# Patient Record
Sex: MALE | Race: BLACK OR AFRICAN AMERICAN | NOT HISPANIC OR LATINO | URBAN - METROPOLITAN AREA
[De-identification: names, ages, dates, MRNs, and addresses within clinical notes are randomized per-mention and may not be internally consistent; named-entity substitution may affect disease eponyms.]

---

## 2022-10-28 ENCOUNTER — APPOINTMENT (EMERGENCY)
Dept: RADIOLOGY | Facility: HOSPITAL | Age: 32
End: 2022-10-28

## 2022-10-28 ENCOUNTER — HOSPITAL ENCOUNTER (EMERGENCY)
Facility: HOSPITAL | Age: 32
Discharge: HOME | End: 2022-10-29
Attending: EMERGENCY MEDICINE
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
## 2022-10-28 DIAGNOSIS — S43.014A ANTERIOR DISLOCATION OF RIGHT SHOULDER, INITIAL ENCOUNTER: Primary | ICD-10-CM

## 2022-10-28 RX ORDER — FENTANYL CITRATE 50 UG/ML
50 INJECTION, SOLUTION INTRAMUSCULAR; INTRAVENOUS ONCE
Status: COMPLETED | OUTPATIENT
Start: 2022-10-28 | End: 2022-10-28

## 2022-10-28 RX ORDER — PROPOFOL 10 MG/ML
100 INJECTION, EMULSION INTRAVENOUS ONCE
Status: COMPLETED | OUTPATIENT
Start: 2022-10-28 | End: 2022-10-28

## 2022-10-28 RX ORDER — FENTANYL CITRATE 50 UG/ML
100 INJECTION, SOLUTION INTRAMUSCULAR; INTRAVENOUS ONCE
Status: COMPLETED | OUTPATIENT
Start: 2022-10-28 | End: 2022-10-28

## 2022-10-28 RX ADMIN — FENTANYL CITRATE 50 MCG: 50 INJECTION INTRAMUSCULAR; INTRAVENOUS at 22:43

## 2022-10-28 RX ADMIN — FENTANYL CITRATE 100 MCG: 50 INJECTION INTRAMUSCULAR; INTRAVENOUS at 23:49

## 2022-10-28 RX ADMIN — PROPOFOL 120 MG: 10 INJECTION, EMULSION INTRAVENOUS at 23:54

## 2022-10-28 NOTE — Clinical Note
Nola Cruz was seen and treated in our emergency department on 10/28/2022  Other - See Comments        Diagnosis:     Duane    He may return on this date:     No work until cleared by orthopedics/occupational medicine  If you have any questions or concerns, please don't hesitate to call        Kym Jensen DO    ______________________________           _______________          _______________  Hospital Representative                              Date                                Time

## 2022-10-28 NOTE — Clinical Note
Nikole Hunter was seen and treated in our emergency department on 10/28/2022  Other - See Comments        Diagnosis:     Duane    He may return on this date:     No work until cleared by orthopedics/occupational medicine  If you have any questions or concerns, please don't hesitate to call        Deng Patel, DO    ______________________________           _______________          _______________  Hospital Representative                              Date                                Time

## 2022-10-29 ENCOUNTER — APPOINTMENT (EMERGENCY)
Dept: RADIOLOGY | Facility: HOSPITAL | Age: 32
End: 2022-10-29

## 2022-10-29 ENCOUNTER — APPOINTMENT (OUTPATIENT)
Dept: URGENT CARE | Facility: CLINIC | Age: 32
End: 2022-10-29

## 2022-10-29 VITALS
SYSTOLIC BLOOD PRESSURE: 151 MMHG | DIASTOLIC BLOOD PRESSURE: 75 MMHG | OXYGEN SATURATION: 99 % | RESPIRATION RATE: 18 BRPM | TEMPERATURE: 97.3 F | WEIGHT: 220 LBS | HEART RATE: 75 BPM

## 2022-10-29 RX ORDER — NAPROXEN 375 MG/1
375 TABLET ORAL 2 TIMES DAILY WITH MEALS
Qty: 20 TABLET | Refills: 0 | Status: SHIPPED | OUTPATIENT
Start: 2022-10-29

## 2022-10-29 NOTE — ED ATTENDING ATTESTATION
10/28/2022  IChirag MD, saw and evaluated the patient  I have discussed the patient with the resident/non-physician practitioner and agree with the resident's/non-physician practitioner's findings, Plan of Care, and MDM as documented in the resident's/non-physician practitioner's note, except where noted  All available labs and Radiology studies were reviewed  I was present for key portions of any procedure(s) performed by the resident/non-physician practitioner and I was immediately available to provide assistance  At this point I agree with the current assessment done in the Emergency Department  I have conducted an independent evaluation of this patient a history and physical is as follows:    26-year-old male presented for evaluation acute right shoulder pain after fall from ladder at work today  Reports he lost his footing and fell down, attempting to break his fall held on to the ladder causing acute pain in the shoulder  Landed on his feet  No head strike  No anticoagulation  Has ongoing right shoulder pain with limitation of movement  On exam he has obvious deformity with flattening of the deltoid  Reported some paresthesias in hand but has normal movement, strength  Normal radial pulse  Normal capillary refill  X-ray shows anterior glenohumeral dislocation without fracture  Plan moderate sedation for closed reduction  ED Course  ED Course as of 10/29/22 0056   Sat Oct 29, 2022   0036 Patient moderately sedated with propofol fall, fentanyl for pain control  Closed reduction with Radhames Ignacio PGY2 confirmed on postreduction film  No immediate complications           Critical Care Time  Procedures

## 2022-10-29 NOTE — ED PROVIDER NOTES
History  Chief Complaint   Patient presents with   • Shoulder Injury     Pt states he fell off off ladder (about 8ft) about 30 minutes afo  C/o of right shoulder pain  Denies head strike, thinners and aspirin  GCS 15  Enrique Montano is a 26-year-old male who presents with right shoulder pain after falling off ladder at work 30 minutes prior to arrival   [employer] he lost his balance, grabbed the ladder with his right arm to break his fall, felt a pop in his shoulder with immediate onset sharp to aching, severe, non-radiating right shoulder  pain, has not been able to move his right shoulder since, feels it is out of place  Has mild numbness and tingling in his right hand, however is able to move his right elbow, wrist, and fingers  Denies hitting head or losing consciousness  No prior right shoulder injuries  No other injuries  None       History reviewed  No pertinent past medical history  History reviewed  No pertinent surgical history  History reviewed  No pertinent family history  I have reviewed and agree with the history as documented  E-Cigarette/Vaping     E-Cigarette/Vaping Substances     Social History     Tobacco Use   • Smoking status: Current Every Day Smoker     Packs/day: 1 00     Years: 10 00     Pack years: 10 00     Types: Cigarettes   • Smokeless tobacco: Current User   Substance Use Topics   • Alcohol use: Yes   • Drug use: Not Currently        Review of Systems   Constitutional: Negative  Negative for fatigue  HENT: Negative  Negative for facial swelling  Eyes: Negative  Negative for visual disturbance  Respiratory: Negative  Negative for shortness of breath  Cardiovascular: Negative  Negative for chest pain and palpitations  Gastrointestinal: Negative  Negative for abdominal pain, diarrhea, nausea and vomiting  Genitourinary: Negative  Negative for decreased urine volume  Musculoskeletal: Negative for back pain, gait problem and neck pain          Endorses right shoulder pain  Skin: Negative  Negative for pallor and wound  Neurological: Negative  Negative for dizziness, syncope, weakness, light-headedness and headaches  Hematological: Negative  Does not bruise/bleed easily  All other systems reviewed and are negative  Physical Exam  ED Triage Vitals [10/28/22 2157]   Temperature Pulse Respirations Blood Pressure SpO2   (!) 97 3 °F (36 3 °C) 55 20 153/97 98 %      Temp Source Heart Rate Source Patient Position - Orthostatic VS BP Location FiO2 (%)   Oral Monitor Sitting Left arm --      Pain Score       10 - Worst Possible Pain             Orthostatic Vital Signs  Vitals:    10/29/22 0209 10/29/22 0210 10/29/22 0212 10/29/22 0215   BP:  144/75  151/75   Pulse: 61  55 75   Patient Position - Orthostatic VS:           Physical Exam  Vitals and nursing note reviewed  Constitutional:       Appearance: He is not diaphoretic  Comments: Appears to be in pain  HENT:      Head: Normocephalic and atraumatic  Right Ear: External ear normal       Left Ear: External ear normal       Nose: Nose normal       Mouth/Throat:      Mouth: Mucous membranes are moist       Pharynx: Oropharynx is clear  Eyes:      Extraocular Movements: Extraocular movements intact  Conjunctiva/sclera: Conjunctivae normal       Pupils: Pupils are equal, round, and reactive to light  Cardiovascular:      Rate and Rhythm: Normal rate and regular rhythm  Pulses: Normal pulses  Heart sounds: Normal heart sounds  Pulmonary:      Effort: Pulmonary effort is normal  No respiratory distress  Breath sounds: Normal breath sounds  Abdominal:      General: Abdomen is flat  Bowel sounds are normal       Palpations: Abdomen is soft  Tenderness: There is no abdominal tenderness  Musculoskeletal:      Right shoulder: Deformity, tenderness and bony tenderness present  Decreased range of motion  Normal pulse        Left shoulder: Normal       Cervical back: Normal, normal range of motion and neck supple  No tenderness  Thoracic back: Normal       Lumbar back: Normal       Comments: Right shoulder deformed, unable to tolerate any passive ROM  RUE with decreased sensation over right forearm and hand, however 2+ radial pulses present  Skin:     General: Skin is warm and dry  Capillary Refill: Capillary refill takes less than 2 seconds  Neurological:      General: No focal deficit present  Mental Status: He is alert  Psychiatric:         Mood and Affect: Mood normal          Behavior: Behavior normal          ED Medications  Medications   fentanyl citrate (PF) 100 MCG/2ML 50 mcg (50 mcg Intravenous Given 10/28/22 2243)   fentanyl citrate (PF) 100 MCG/2ML 100 mcg (100 mcg Intravenous Given 10/28/22 2349)   propofol (DIPRIVAN) 200 MG/20ML bolus injection 100 mg (120 mg Intravenous Given 10/28/22 2354)       Diagnostic Studies  Results Reviewed     None                 XR shoulder 1 vw right   ED Interpretation by Lay Piña MD (10/29 0034)   Reduction of anterior dislocation  Final Result by Conchis Ribeiro MD (10/29 1022)      Right glenohumeral dislocation has now been reduced  Probable small Hill-Sachs deformity is evident  The study was marked in [facility] for immediate notification  Workstation performed: [id number]         XR shoulder 2+ views RIGHT   ED Interpretation by Lay Piña MD (10/29 6534)   Anterior glenohumeral dislocation      Final Result by Conchis Ribeiro MD (10/29 1019)      Anteromedial glenohumeral dislocation  Anteromedial dislocation was noted on the ER preliminary result  Distal humeral shaft is limited without gross abnormality  Follow-up could be performed if symptoms persist       Workstation performed: [id number]         XR humerus RIGHT   ED Interpretation by Lay Piña MD (10/29 0034)   No fracture        Final Result by Conchis Ribeiro MD (10/29 1019)      Anteromedial glenohumeral dislocation  Anteromedial dislocation was noted on the ER preliminary result  Distal humeral shaft is limited without gross abnormality  Follow-up could be performed if symptoms persist       Workstation performed: [id number]               Procedures  Pre-Procedural Sedation  Performed by: Dariela Warren DO  Authorized by: Dariela Warren DO     Consent:     Consent obtained:  Written    Consent given by:  Patient    Risks discussed:  Prolonged hypoxia resulting in organ damage, allergic reaction, dysrhythmia, prolonged sedation necessitating reversal, respiratory compromise necessitating ventilatory assistance and intubation, inadequate sedation, nausea and vomiting    Alternatives discussed:  Analgesia without sedation, anxiolysis and regional anesthesia  [place] protocol:     Procedure explained and questions answered to patient or proxy's satisfaction: yes      Relevant documents present and verified: yes      Test results available and properly labeled: yes      Radiology Images displayed and confirmed    If images not available, report reviewed: yes      Required blood products, implants, devices, and special equipment available: yes      Site/side marked: yes      Immediately prior to procedure a time out was called: yes      Patient identity confirmation method:  Verbally with patient and arm band  Indications:     Sedation purpose:  Dislocation reduction    Procedure necessitating sedation performed by:  Physician performing sedation    Intended level of sedation:  Moderate (conscious sedation)  Pre-sedation assessment:     Time since last food or drink:  9 hours    ASA classification: class 1 - normal, healthy patient      Neck mobility: normal      Mouth opening:  3 or more finger widths    Thyromental distance:  4 finger widths    Mallampati score:  II - soft palate, uvula, fauces visible    Pre-sedation assessments completed and reviewed: airway patency, cardiovascular function, hydration status, mental status, nausea/vomiting, pain level, respiratory function and temperature      History of difficult intubation: no      Pre-sedation assessment completed:  10/28/2022 11:24 PM    Procedural Sedation    Date/Time: 10/28/2022 11:50 PM  Performed by: Chico Beavers DO  Authorized by: Chico Beavers DO     Immediate pre-procedure details:     Reassessment: Patient reassessed immediately prior to procedure      Reviewed: vital signs, relevant labs/tests and NPO status      Verified: bag valve mask available, emergency equipment available, intubation equipment available, IV patency confirmed, oxygen available and suction available    Procedure details (see MAR for exact dosages):     Sedation start time:  10/28/2022 11:50 PM    Preoxygenation:  Room air    Sedation:  Propofol    Analgesia:  Fentanyl    Intra-procedure monitoring:  Blood pressure monitoring, continuous capnometry, frequent LOC assessments, frequent vital sign checks, continuous pulse oximetry and cardiac monitor    Intra-procedure events: hypoxia      Intra-procedure management:  Airway repositioning and supplemental oxygen    Sedation end time:  10/29/2022 12:05 PM    Total sedation time (minutes):  15  Post-procedure details:     Attendance: Constant attendance by certified staff until patient recovered      Recovery: Patient returned to pre-procedure baseline      Post-sedation assessments completed and reviewed: airway patency, cardiovascular function, hydration status, mental status, nausea/vomiting, pain level, respiratory function and temperature      Patient is stable for discharge or admission: yes      Patient tolerance:   Tolerated well, no immediate complications  Orthopedic injury treatment    Date/Time: 10/28/2022 11:50 PM  Performed by: Chico Beavers DO  Authorized by: Chico Beavers DO     Patient Location:  ED  [place] Protocol:  Procedure performed by: (Dr Vijay Gilbert, Dr Hema Jefferson)  Consent: Verbal consent obtained  Written consent obtained  Risks and benefits: risks, benefits and alternatives were discussed  Consent given by: patient  Time out: Immediately prior to procedure a "time out" was called to verify the correct patient, procedure, equipment, support staff and site/side marked as required  Timeout called at: 10/28/2022 11:50 PM   Patient understanding: patient states understanding of the procedure being performed  Patient consent: the patient's understanding of the procedure matches consent given  Procedure consent: procedure consent matches procedure scheduled  Relevant documents: relevant documents present and verified  Test results: test results available and properly labeled  Site marked: the operative site was marked  Radiology Images displayed and confirmed  If images not available, report reviewed: imaging studies available  Required items: required blood products, implants, devices, and special equipment available  Patient identity confirmed: verbally with patient and arm band      Injury location:  Shoulder  Location details:  Right shoulder  Injury type:  Dislocation  Dislocation type: anterior    Chronicity:  New  Hill-Sachs deformity?: No    Distal perfusion: normal    Neurological function: diminished    Range of motion: reduced    Local anesthesia used?: No    General anesthesia used?: No    Sedation type:   Moderate (conscious) sedation (See separate Procedural Sedation form)  Manipulation performed?: Yes    Reduction method:  External rotation  Reduction method:  External rotation  Reduction method:  External rotation  Reduction method:  External rotation  Reduction method:  External rotation  Reduction method:  External rotation  Reduction successful?: Yes    Confirmation: Reduction confirmed by x-ray    Immobilization:  Sling  Neurovascular status: Neurovascularly intact    Distal perfusion: normal    Neurological function: normal    Range of motion: normal    Patient tolerance:  Patient tolerated the procedure well with no immediate complications          ED Course                                       MDM  Number of Diagnoses or Management Options  Anterior dislocation of right shoulder, initial encounter  Fall, initial encounter  Diagnosis management comments: Pt presents with right shoulder pain, no other injuries, s/p fall from ladder  No LOC or head strike  Workup consistent with right anterior shoulder dislocation  Procedural sedation and reduction performed as documented with return of full sensation to right upper extremity status post successful reduction  Sling placed  To follow-up with Ortho on Monday, work note given  Improved on discharge  Amount and/or Complexity of Data Reviewed  Tests in the radiology section of CPT®: ordered and reviewed  Review and summarize past medical records: yes  Discuss the patient with other providers: yes  Independent visualization of images, tracings, or specimens: yes        Disposition  Final diagnoses:   Anterior dislocation of right shoulder, initial encounter   Fall, initial encounter     Time reflects when diagnosis was documented in both MDM as applicable and the Disposition within this note     Time User Action Codes Description Comment    10/29/2022  2:13 AM Alex Jordan Add [S43 014A] Anterior dislocation of right shoulder, initial encounter     10/29/2022  2:13 AM Alex Jordan Add [D15  Lucy Christopher, initial encounter       ED Disposition     ED Disposition   Discharge    Condition   Stable    Date/Time   Sat Oct 29, 2022  2:13 AM    Comment   Srinivas Webb discharge to home/self care                 Follow-up Information     Follow up With Specialties Details Why Contact Info Additional [phone] [facility] [place] [facility] Schedule an appointment as soon as possible for a visit   [address] [phone]  [address] [place] [address] Specialists [place], [address] [facility] [address] [place], [place], [address] [place] [address]          Discharge Medication List as of 10/29/2022  2:18 AM      START taking these medications    Details   naproxen (NAPROSYN) 375 mg tablet Take 1 tablet (375 mg total) by mouth 2 (two) times a day with meals, Starting Sat 10/29/2022, Print           No discharge procedures on file  PDMP Review     None           ED Provider  Attending physically available and evaluated Gina Ruff I managed the patient along with the ED Attending      Electronically Signed by         Sole Terrell DO  11/06/22 8411

## 2024-04-15 ENCOUNTER — OFFICE VISIT (OUTPATIENT)
Dept: URGENT CARE | Age: 34
End: 2024-04-15
Payer: COMMERCIAL

## 2024-04-15 VITALS
DIASTOLIC BLOOD PRESSURE: 70 MMHG | OXYGEN SATURATION: 100 % | RESPIRATION RATE: 20 BRPM | HEART RATE: 70 BPM | SYSTOLIC BLOOD PRESSURE: 110 MMHG | TEMPERATURE: 97.8 F | WEIGHT: 216.3 LBS

## 2024-04-15 DIAGNOSIS — R05.1 ACUTE COUGH: ICD-10-CM

## 2024-04-15 DIAGNOSIS — H60.501 ACUTE OTITIS EXTERNA OF RIGHT EAR, UNSPECIFIED TYPE: Primary | ICD-10-CM

## 2024-04-15 PROCEDURE — 99215 OFFICE O/P EST HI 40 MIN: CPT | Performed by: PHYSICIAN ASSISTANT

## 2024-04-15 RX ORDER — OFLOXACIN 3 MG/ML
10 SOLUTION AURICULAR (OTIC) DAILY
Qty: 5 ML | Refills: 0 | Status: SHIPPED | OUTPATIENT
Start: 2024-04-15 | End: 2024-04-22

## 2024-04-15 NOTE — PATIENT INSTRUCTIONS
Use ofloxacin ear drops as prescribed, keep ear up for 3-5 minutes after application of drops.   Take over the counter Tylenol and Ibuprofen as needed for pain.   If symptoms are not improved in 2-3 days, follow-up with PCP or return.   If symptoms worsen or new symptoms develop, report to the emergency department immediately.

## 2024-04-15 NOTE — PROGRESS NOTES
[facility]        NAME: Duane Fleming is a 34 y.o. male  : 1990    MRN: [id number]  DATE: April 15, 2024  TIME: 7:53 PM    Assessment and Plan   Acute otitis externa of right ear, unspecified type [H60.501]  1. Acute otitis externa of right ear, unspecified type  ofloxacin (FLOXIN) 0.3 % otic solution      2. Acute cough        Patient presents with symptoms and examination consistent with otitis externa he will be started on Floxin drops to treat.  He also has an acute cough and is instructed to continue symptomatic care for this.      Patient Instructions     Patient Instructions   Use ofloxacin ear drops as prescribed, keep ear up for 3-5 minutes after application of drops.   Take over the counter Tylenol and Ibuprofen as needed for pain.   If symptoms are not improved in 2-3 days, follow-up with PCP or return.   If symptoms worsen or new symptoms develop, report to the emergency department immediately.     Follow up with PCP in 3-5 days.  Proceed to  ER if symptoms worsen.    Chief Complaint     Chief Complaint   Patient presents with    Earache     Started with nasal congestion last week and cough . Cough is getting better. Right ear pain x 1 week, has worsened over the last two days. Pain in moving into right jaw, also reporting throat pain on right side.     Cough         History of Present Illness       34-year-old male presents with complaint of right ear pain.  Patient reports that he had runny nose, congestion and cough for approximately 1 week congestion is mostly improved and cough is improving as well.  Cough has been dry.  Patient denies any chest pain or shortness of breath with the cough.  He denies any fever.  Patient reports that he feels like his right ear pain has been getting worse over the last couple days.    Earache   Associated symptoms include coughing and rhinorrhea.   Cough  Associated symptoms include ear pain, postnasal drip and rhinorrhea. Pertinent negatives include  no chills or fever.       Review of Systems   Review of Systems   Constitutional:  Negative for chills and fever.   HENT:  Positive for congestion, ear pain, postnasal drip and rhinorrhea.    Respiratory:  Positive for cough.          Current Medications       Current Outpatient Medications:     ofloxacin (FLOXIN) 0.3 % otic solution, Administer 10 drops to the right ear daily for 7 days, Disp: 5 mL, Rfl: 0    naproxen (NAPROSYN) 375 mg tablet, Take 1 tablet (375 mg total) by mouth 2 (two) times a day with meals (Patient not taking: Reported on 4/15/2024), Disp: 20 tablet, Rfl: 0    Current Allergies     Allergies as of 04/15/2024    (No Known Allergies)            The following portions of the patient's history were reviewed and updated as appropriate: allergies, current medications, past family history, past medical history, past social history, past surgical history and problem list.     No past medical history on file.    No past surgical history on file.    No family history on file.      Medications have been verified.        Objective   /70   Pulse 70   Temp 97.8 °F (36.6 °C) (Temporal)   Resp 20   Wt 98.1 kg (216 lb 4.8 oz)   SpO2 100%   No LMP for male patient.       Physical Exam     Physical Exam  Vitals and nursing note reviewed.   Constitutional:       General: He is not in acute distress.     Appearance: Normal appearance. He is not ill-appearing or toxic-appearing.   HENT:      Head: Normocephalic and atraumatic.      Jaw: No trismus.      Right Ear: Tympanic membrane, ear canal and external ear normal. Drainage, swelling and tenderness present. There is no impacted cerumen. No foreign body.      Left Ear: Tympanic membrane, ear canal and external ear normal. There is no impacted cerumen. No foreign body.      Ears:      Comments: Right ear with swelling in the ear canal and erythema and tenderness with movement.  There is debris noted in the canal as well consistent with otitis externa.      Nose: No nasal deformity, mucosal edema, congestion or rhinorrhea.      Right Nostril: No foreign body, epistaxis or occlusion.      Left Nostril: No foreign body, epistaxis or occlusion.      Right Turbinates: Not enlarged, swollen or pale.      Left Turbinates: Not enlarged, swollen or pale.      Mouth/Throat:      Lips: Pink. No lesions.      Mouth: Mucous membranes are moist. No injury, oral lesions or angioedema.      Dentition: Normal dentition.      Tongue: No lesions. Tongue does not deviate from midline.      Palate: No mass and lesions.      Pharynx: Uvula midline. No pharyngeal swelling, oropharyngeal exudate, posterior oropharyngeal erythema or uvula swelling.      Tonsils: No tonsillar exudate or tonsillar abscesses.   Eyes:      General: Lids are normal. Gaze aligned appropriately. No allergic shiner.     Extraocular Movements: Extraocular movements intact.   Cardiovascular:      Rate and Rhythm: Normal rate and regular rhythm.      Heart sounds: Normal heart sounds, S1 normal and S2 normal. Heart sounds not distant. No murmur heard.     No friction rub. No gallop.   Pulmonary:      Effort: Pulmonary effort is normal.      Breath sounds: Normal breath sounds. No decreased breath sounds, wheezing, rhonchi or rales.      Comments: Patient speaking in full sentences with no increased respiratory effort.   No audible wheezing or stridor.   Lymphadenopathy:      Cervical: No cervical adenopathy.   Skin:     General: Skin is warm and dry.   Neurological:      Mental Status: He is alert and oriented to person, place, and time.      Coordination: Coordination is intact.      Gait: Gait is intact.   Psychiatric:         Attention and Perception: Attention and perception normal.         Mood and Affect: Mood and affect normal.         Speech: Speech normal.         Behavior: Behavior is cooperative.               Note: Portions of this record may have been created with voice recognition software. Occasional  "wrong word or \"sound a like\" substitutions may have occurred due to the inherent limitations of voice recognition software. Please read the chart carefully and recognize, using context, where substitutions have occurred.*      "

## 2024-04-15 NOTE — LETTER
April 15, 2024     Patient: Duane Fleming   YOB: 1990   Date of Visit: 4/15/2024       To Whom It May Concern:    It is my medical opinion that Duane Fleming may return to work on 04/17/2024 .    If you have any questions or concerns, please don't hesitate to call.         Sincerely,        Mabel Mcginnis PA-C    CC: No Recipients

## 2024-04-26 ENCOUNTER — OFFICE VISIT (OUTPATIENT)
Dept: FAMILY MEDICINE CLINIC | Facility: CLINIC | Age: 34
End: 2024-04-26

## 2024-04-26 VITALS
TEMPERATURE: 99.9 F | OXYGEN SATURATION: 98 % | WEIGHT: 218.4 LBS | BODY MASS INDEX: 30.57 KG/M2 | SYSTOLIC BLOOD PRESSURE: 130 MMHG | DIASTOLIC BLOOD PRESSURE: 78 MMHG | HEART RATE: 66 BPM | HEIGHT: 71 IN | RESPIRATION RATE: 18 BRPM

## 2024-04-26 DIAGNOSIS — R79.89 LOW VITAMIN D LEVEL: ICD-10-CM

## 2024-04-26 DIAGNOSIS — Z72.51 HIGH RISK HETEROSEXUAL BEHAVIOR: ICD-10-CM

## 2024-04-26 DIAGNOSIS — Z13.6 SCREENING FOR CARDIOVASCULAR CONDITION: ICD-10-CM

## 2024-04-26 DIAGNOSIS — Z83.49 FAMILY HISTORY OF THYROID DISEASE: ICD-10-CM

## 2024-04-26 DIAGNOSIS — M54.50 LEFT-SIDED LOW BACK PAIN WITHOUT SCIATICA, UNSPECIFIED CHRONICITY: Primary | ICD-10-CM

## 2024-04-26 DIAGNOSIS — Z11.59 NEED FOR HEPATITIS C SCREENING TEST: ICD-10-CM

## 2024-04-26 DIAGNOSIS — R76.8 POSITIVE ANA (ANTINUCLEAR ANTIBODY): ICD-10-CM

## 2024-04-26 PROCEDURE — 3725F SCREEN DEPRESSION PERFORMED: CPT | Performed by: FAMILY MEDICINE

## 2024-04-26 PROCEDURE — 99203 OFFICE O/P NEW LOW 30 MIN: CPT | Performed by: FAMILY MEDICINE

## 2024-04-26 RX ORDER — ACETAMINOPHEN 500 MG
500 TABLET ORAL EVERY 6 HOURS PRN
Qty: 180 TABLET | Refills: 1 | Status: SHIPPED | OUTPATIENT
Start: 2024-04-26 | End: 2024-04-26

## 2024-04-26 RX ORDER — ACETAMINOPHEN 500 MG
1000 TABLET ORAL EVERY 8 HOURS PRN
Qty: 180 TABLET | Refills: 1 | Status: SHIPPED | OUTPATIENT
Start: 2024-04-26

## 2024-04-26 RX ORDER — METHOCARBAMOL 500 MG/1
500 TABLET, FILM COATED ORAL
Qty: 30 TABLET | Refills: 0 | Status: SHIPPED | OUTPATIENT
Start: 2024-04-26

## 2024-04-26 NOTE — ASSESSMENT & PLAN NOTE
History of multiple heterosexual partners with inconsistent condom usage. Has never had STD testing before, requesting same.

## 2024-04-26 NOTE — PROGRESS NOTES
Name: Duane Fleming      : 1990      MRN: [id number]  Encounter Provider: Jean-Paul Chand MD  Encounter Date: 2024   Encounter department: [facility]    Assessment & Plan     1. Left-sided low back pain without sciatica, unspecified chronicity  Assessment & Plan:  33 y/o male with history of transverse spinal fracture in 2017, with 1 week of back pain following sudden jerking motion at work. Mild improvement with OTC tylenol, ibuprofen, creams. Has responded well to PT in the past. Will trial supportive measures, scheduled tylenol, nightly muscle relaxer. Advised to avoid muscle relaxer before he works, drives. Discussed risks, benefits of PT with patient, will try referral to comprehensive spine.    Orders:  -     Ambulatory Referral to Comprehensive Sp[facility] Program; Future  -     methocarbamol (ROBAXIN) 500 mg tablet; Take 1 tablet (500 mg total) by mouth daily at bedtime as needed for muscle spasms  -     acetaminophen (TYLENOL) 500 mg tablet; Take 2 tablets (1,000 mg total) by mouth every 8 (eight) hours as needed for moderate pain    2. High risk heterosexual behavior  Assessment & Plan:  History of multiple heterosexual partners with inconsistent condom usage. Has never had STD testing before, requesting same.     Orders:  -     HIV 1/2 AG/AB w Reflex [facility] for 2 yr old and above; Future  -     RPR-Syphilis Screening (Total Syphilis IGG/IGM); Future  -     Hep B Surface Ab, Ql; Future  -     Herpes I/II IgG PORFIRIO w Reflex to HSV-2; Future    3. Family history of thyroid disease  -     TSH + Free T4; Future    4. Low vitamin D level  -     Vitamin D 25 hydroxy; Future    5. Need for hepatitis C screening test  -     Hepatitis C antibody; Future    6. Screening for cardiovascular condition  -     Basic metabolic panel; Future  -     Lipid Panel With Direct LDL; Future    7. Positive JAX (antinuclear antibody)  Assessment & Plan:  Patient with elevated JAX  on multiple bloodwork panels in 2018. On chart review, patient did not continue follow up with rheumatology to discuss. Unclear clinical significance, current clinical picture not suspicious for rheumatic disease. Will follow clinically and monitor for suspicious signs/symptoms.             Subjective      35 yo male presents to [facility] care and for a one week history of lower back pain. 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 He admits pain worsens with use and improves with the use of Advil cream. He has tried Aleve and ibuprofen with no relief. He has a PMH of back injury in 2017 but reports this pain is lower on his back. He denies any lower extremity weakness, radiating pain, or loss of incontinence.     Patient also presents with increased urination over the past three weeks. He admits to an increase of water intake. He denies any pain or burning with urination.     Patient has a family history of diabetes in sister, maternal grandmother, and paternal grandfather. His maternal grandmother also has a history of hypertension.    Back Pain  Pertinent negatives include no chest pain, dysuria, fever or headaches.     Review of Systems   Constitutional:  Negative for chills and fever.   Respiratory:  Negative for cough and shortness of breath.    Cardiovascular:  Negative for chest pain and palpitations.   Gastrointestinal:  Negative for nausea and vomiting.   Genitourinary:  Positive for frequency and urgency. Negative for dysuria and hematuria.   Musculoskeletal:  Positive for back pain.   Allergic/Immunologic: Positive for environmental allergies.   Neurological:  Negative for dizziness and headaches.       Current Outpatient Medications on File Prior to Visit   Medication Sig    [DISCONTINUED] naproxen (NAPROSYN) 375 mg tablet Take 1 tablet (375 mg total) by mouth 2 (two) times a day with meals (Patient not taking: Reported on 4/15/2024)       Objective     /78 (BP  "Location: Left arm, Patient Position: Sitting, Cuff Size: Large)   Pulse 66   Temp 99.9 °F (37.7 °C) (Temporal)   Resp 18   Ht 5' 10.7\" (1.796 m)   Wt 99.1 kg (218 lb 6.4 oz)   SpO2 98%   BMI 30.72 kg/m²     Physical Exam  Constitutional:       Appearance: Normal appearance. He is not ill-appearing.   HENT:      Head: Normocephalic and atraumatic.   Cardiovascular:      Rate and Rhythm: Normal rate.      Pulses: Normal pulses.   Pulmonary:      Effort: Pulmonary effort is normal. No respiratory distress.   Abdominal:      General: There is no distension.   Musculoskeletal:         General: Tenderness present. No swelling. Normal range of motion.      Cervical back: Normal range of motion. No swelling or deformity.      Thoracic back: No swelling or deformity.      Lumbar back: Tenderness (Left paraspinal muscles) and bony tenderness present. No swelling, edema, deformity, signs of trauma or spasms. Normal range of motion. No scoliosis.   Skin:     General: Skin is warm and dry.   Neurological:      General: No focal deficit present.      Mental Status: He is alert.   Psychiatric:         Mood and Affect: Mood normal.       Jean-Paul Chand MD    "

## 2024-04-26 NOTE — ASSESSMENT & PLAN NOTE
Patient with elevated JAX on multiple bloodwork panels in 2018. On chart review, patient did not continue follow up with rheumatology to discuss. Unclear clinical significance, current clinical picture not suspicious for rheumatic disease. Will follow clinically and monitor for suspicious signs/symptoms.

## 2024-04-26 NOTE — ASSESSMENT & PLAN NOTE
35 y/o male with history of transverse spinal fracture in 2017, with 1 week of back pain following sudden jerking motion at work. Mild improvement with OTC tylenol, ibuprofen, creams. Has responded well to PT in the past. Will trial supportive measures, scheduled tylenol, nightly muscle relaxer. Advised to avoid muscle relaxer before he works, drives. Discussed risks, benefits of PT with patient, will try referral to comprehensive spine.

## 2024-04-27 ENCOUNTER — APPOINTMENT (OUTPATIENT)
Dept: LAB | Facility: CLINIC | Age: 34
End: 2024-04-27
Payer: COMMERCIAL

## 2024-04-27 DIAGNOSIS — Z13.6 SCREENING FOR CARDIOVASCULAR CONDITION: ICD-10-CM

## 2024-04-27 DIAGNOSIS — R79.89 LOW VITAMIN D LEVEL: ICD-10-CM

## 2024-04-27 DIAGNOSIS — Z72.51 HIGH RISK HETEROSEXUAL BEHAVIOR: ICD-10-CM

## 2024-04-27 DIAGNOSIS — Z83.49 FAMILY HISTORY OF THYROID DISEASE: ICD-10-CM

## 2024-04-27 DIAGNOSIS — Z11.59 NEED FOR HEPATITIS C SCREENING TEST: ICD-10-CM

## 2024-04-27 LAB
25(OH)D3 SERPL-MCNC: 9.6 NG/ML (ref 30–100)
ANION GAP SERPL CALCULATED.3IONS-SCNC: 6 MMOL/L (ref 4–13)
BUN SERPL-MCNC: 14 MG/DL (ref 5–25)
CALCIUM SERPL-MCNC: 9.9 MG/DL (ref 8.4–10.2)
CHLORIDE SERPL-SCNC: 106 MMOL/L (ref 96–108)
CHOLEST SERPL-MCNC: 154 MG/DL
CO2 SERPL-SCNC: 28 MMOL/L (ref 21–32)
CREAT SERPL-MCNC: 0.99 MG/DL (ref 0.6–1.3)
GFR SERPL CREATININE-BSD FRML MDRD: 98 ML/MIN/1.73SQ M
GLUCOSE P FAST SERPL-MCNC: 86 MG/DL (ref 65–99)
HDLC SERPL-MCNC: 29 MG/DL
LDLC SERPL CALC-MCNC: 111 MG/DL (ref 0–100)
POTASSIUM SERPL-SCNC: 4.1 MMOL/L (ref 3.5–5.3)
SODIUM SERPL-SCNC: 140 MMOL/L (ref 135–147)
T4 FREE SERPL-MCNC: 0.68 NG/DL (ref 0.61–1.12)
TRIGL SERPL-MCNC: 70 MG/DL
TSH SERPL DL<=0.05 MIU/L-ACNC: 1.53 UIU/ML (ref 0.45–4.5)

## 2024-04-27 PROCEDURE — 86803 HEPATITIS C AB TEST: CPT

## 2024-04-27 PROCEDURE — 84443 ASSAY THYROID STIM HORMONE: CPT

## 2024-04-27 PROCEDURE — 80048 BASIC METABOLIC PNL TOTAL CA: CPT

## 2024-04-27 PROCEDURE — 87389 HIV-1 AG W/HIV-1&-2 AB AG IA: CPT

## 2024-04-27 PROCEDURE — 36415 COLL VENOUS BLD VENIPUNCTURE: CPT

## 2024-04-27 PROCEDURE — 86706 HEP B SURFACE ANTIBODY: CPT

## 2024-04-27 PROCEDURE — 86780 TREPONEMA PALLIDUM: CPT

## 2024-04-27 PROCEDURE — 84439 ASSAY OF FREE THYROXINE: CPT

## 2024-04-27 PROCEDURE — 80061 LIPID PANEL: CPT

## 2024-04-27 PROCEDURE — 86696 HERPES SIMPLEX TYPE 2 TEST: CPT

## 2024-04-27 PROCEDURE — 82306 VITAMIN D 25 HYDROXY: CPT

## 2024-04-27 PROCEDURE — 86695 HERPES SIMPLEX TYPE 1 TEST: CPT

## 2024-04-28 LAB
HBV SURFACE AB SER-ACNC: 77.3 MIU/ML
HCV AB SER QL: NORMAL
HIV 1+2 AB+HIV1 P24 AG SERPL QL IA: NORMAL
HIV 2 AB SERPL QL IA: NORMAL
HIV1 AB SERPL QL IA: NORMAL
HIV1 P24 AG SERPL QL IA: NORMAL
TREPONEMA PALLIDUM IGG+IGM AB [PRESENCE] IN SERUM OR PLASMA BY IMMUNOASSAY: NORMAL

## 2024-04-29 ENCOUNTER — NURSE TRIAGE (OUTPATIENT)
Dept: PHYSICAL THERAPY | Facility: OTHER | Age: 34
End: 2024-04-29

## 2024-04-29 DIAGNOSIS — G89.29 ACUTE EXACERBATION OF CHRONIC LOW BACK PAIN: Primary | ICD-10-CM

## 2024-04-29 DIAGNOSIS — M54.50 ACUTE EXACERBATION OF CHRONIC LOW BACK PAIN: Primary | ICD-10-CM

## 2024-04-29 NOTE — TELEPHONE ENCOUNTER
Additional Information   Negative: Has the patient had unexplained weight loss?   Negative: Does the patient have a fever?   Negative: Is the patient experiencing urine retention?   Negative: Is the patient experiencing blood in sputum?   Negative: Is the patient experiencing acute drop foot or paralysis?   Negative: Has the patient experienced major trauma? (fall from height, high speed collision, direct blow to spine) and is also experiencing nausea, light-headedness, or loss of consciousness?   Affirmative: Is this a chronic condition?    Protocols used: [facility] Protocol    [facility] was reviewed in detail and what we can provide for their back pain.  Patient is agreeable to being triaged and would like to proceed with Physical Therapy.    Referral was placed for Physical Therapy at the [facility] site. Patients information was sent to the  to make evaluation appointment. Patient made aware that the PT office  will be calling to schedule the appointment.  Patient was provided with the phone number to the PT office.    No further questions and/or concerns were voiced by the patient at this time. Patient states understanding of the referral that was placed.    Referral Closed.
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
## 2024-04-30 LAB — SCAN RESULT: NORMAL

## 2024-07-29 ENCOUNTER — OFFICE VISIT (OUTPATIENT)
Dept: FAMILY MEDICINE CLINIC | Facility: CLINIC | Age: 34
End: 2024-07-29

## 2024-07-29 VITALS
DIASTOLIC BLOOD PRESSURE: 75 MMHG | SYSTOLIC BLOOD PRESSURE: 122 MMHG | HEART RATE: 60 BPM | RESPIRATION RATE: 18 BRPM | TEMPERATURE: 98.4 F | WEIGHT: 222.8 LBS | BODY MASS INDEX: 31.9 KG/M2 | OXYGEN SATURATION: 99 % | HEIGHT: 70 IN

## 2024-07-29 DIAGNOSIS — E55.9 VITAMIN D DEFICIENCY: ICD-10-CM

## 2024-07-29 DIAGNOSIS — Z00.00 ANNUAL PHYSICAL EXAM: Primary | ICD-10-CM

## 2024-07-29 DIAGNOSIS — G89.29 CHRONIC PAIN OF LEFT KNEE: ICD-10-CM

## 2024-07-29 DIAGNOSIS — M25.562 CHRONIC PAIN OF LEFT KNEE: ICD-10-CM

## 2024-07-29 PROCEDURE — G0439 PPPS, SUBSEQ VISIT: HCPCS | Performed by: FAMILY MEDICINE

## 2024-07-29 RX ORDER — ERGOCALCIFEROL 1.25 MG/1
50000 CAPSULE ORAL WEEKLY
Qty: 6 CAPSULE | Refills: 0 | Status: SHIPPED | OUTPATIENT
Start: 2024-07-29

## 2024-07-29 RX ORDER — ERGOCALCIFEROL 1.25 MG/1
50000 CAPSULE ORAL
Qty: 2 CAPSULE | Refills: 0 | Status: SHIPPED | OUTPATIENT
Start: 2024-07-29 | End: 2024-07-29

## 2024-07-29 NOTE — ASSESSMENT & PLAN NOTE
Pt found to be vitamin D deficient after last visits labs.  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  Diet is red/white meat with rice and vegetables.    Plan:  [id number] vitamin D weekly for 6 weeks ordered  Can recheck vitamin D labs during pts next visit in 3 months.  
Pt has ongoing left knee pain with edema that began after being tackled while playing football when he was 17.   Pt states that he did not have imaging at the time and did not use any medications or bracing to make the pain better.  Recently he has tried bracing and OTC tylenol which did not help his pain.  Endorses swelling within the joint line with popping and clicking of the knee while walking.    Plan:  Referral to PT  Continue OTC tylenol as needed  Will order MRI to evaluate soft tissues if pain persists after PT    
You can access the FollowMyHealth Patient Portal offered by [facility] by registering at the following website: [email or web address]. By joining "[employer]’s FollowMyHealth portal, you will also be able to view your health information using other applications (apps) compatible with our system.

## 2024-07-29 NOTE — PROGRESS NOTES
Adult Annual Physical  Name: Duane Fleming      : 1990      MRN: [id number]  Encounter Provider: Jean-Paul Chand MD  Encounter Date: 2024   Encounter department: [facility]    Assessment & Plan   1. Annual physical exam  2. Chronic pain of left knee  Assessment & Plan:  Pt has ongoing left knee pain with edema that began after being tackled while playing football when he was 17.   Pt states that he did not have imaging at the time and did not use any medications or bracing to make the pain better.  Recently he has tried bracing and OTC tylenol which did not help his pain.  Endorses swelling within the joint line with popping and clicking of the knee while walking.    Plan:  Referral to PT  Continue OTC tylenol as needed  Will order MRI to evaluate soft tissues if pain persists after PT    Orders:  -     Ambulatory Referral to Physical Therapy; Future  3. Vitamin D deficiency  Assessment & Plan:  Pt found to be vitamin D deficient after last visits labs.  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  Diet is red/white meat with rice and vegetables.    Plan:  [id number] vitamin D weekly for 6 weeks ordered  Can recheck vitamin D labs during pts next visit in 3 months.  Orders:  -     ergocalciferol (VITAMIN D2) 50,000 units; Take 1 capsule (50,000 Units total) by mouth once a week  Immunizations and preventive care screenings were discussed with patient today. Appropriate education was printed on patient's after visit summary.    Counseling:  Counseled on sleeping 8 hours a night. Pt works night shift and finds difficulty with sleep at times, getting 6 hours consistently.         History of Present Illness     Adult Annual Physical:  Patient presents for annual physical. Pt was counseled on duration of sleep, diet, exercise and home environment. He regularly sees a dentist with the last visit in March and has not seen an eye doctor for  3 years. Wears glasses. Counseled on regular eye screening and will make an appointment for the eye doctor..     Diet and Physical Activity:  - Diet/Nutrition: well balanced diet.  - Exercise: walking.    General Health:  - Sleep: 7-8 hours of sleep on average.  - Hearing: normal hearing right ear, normal hearing left ear and normal hearing bilateral ears.  - Vision: wears glasses. Sees eye doctor regularly  - Dental: regular dental visits. Last time he saw his dentist was in March     Health:  - History of STDs: no.   - Urinary symptoms: none.     Advanced Care Planning:  - Has an advanced directive?: no    - Has a durable medical POA?: no    - ACP document given to patient?: no      Review of Systems   Constitutional:  Negative for chills and fever.   HENT:  Negative for ear pain and sore throat.    Eyes:  Negative for pain and visual disturbance.   Respiratory:  Negative for cough and shortness of breath.    Cardiovascular:  Negative for chest pain and palpitations.   Gastrointestinal:  Negative for abdominal pain and vomiting.   Genitourinary:  Negative for dysuria and hematuria.   Musculoskeletal:  Positive for back pain and joint swelling.        Pt states that he has chronic left knee pain that has been getting progressively worse over the last few months with swelling at times.   Skin:  Positive for rash. Negative for color change.        Pt had paint fall on his back at work 3 days ago 07/27. It was initially pruritic and progressed to skin, which is resolving.   Neurological:  Negative for seizures and syncope.   All other systems reviewed and are negative.    Current Outpatient Medications on File Prior to Visit   Medication Sig Dispense Refill    acetaminophen (TYLENOL) 500 mg tablet Take 2 tablets (1,000 mg total) by mouth every 8 (eight) hours as needed for moderate pain 180 tablet 1    methocarbamol (ROBAXIN) 500 mg tablet Take 1 tablet (500 mg total) by mouth daily at bedtime as needed for muscle  "spasms 30 tablet 0     No current facility-administered medications on file prior to visit.        Objective     /75 (BP Location: Left arm, Patient Position: Sitting, Cuff Size: Large)   Pulse 60   Temp 98.4 °F (36.9 °C) (Temporal)   Resp 18   Ht 5' 10\" (1.778 m)   Wt 101 kg (222 lb 12.8 oz)   SpO2 99%   BMI 31.97 kg/m²     Physical Exam  Vitals and nursing note reviewed.   Constitutional:       General: He is not in acute distress.     Appearance: He is well-developed.   HENT:      Head: Normocephalic and atraumatic.      Nose: Nose normal.   Eyes:      Conjunctiva/sclera: Conjunctivae normal.   Cardiovascular:      Rate and Rhythm: Normal rate and regular rhythm.      Heart sounds: No murmur heard.  Pulmonary:      Effort: Pulmonary effort is normal. No respiratory distress.      Breath sounds: Normal breath sounds.   Abdominal:      Palpations: Abdomen is soft.      Tenderness: There is no abdominal tenderness.   Musculoskeletal:         General: Swelling and tenderness present.      Cervical back: Neck supple.      Comments: Pt states that he has chronic left knee pain that has been getting worse over the last few months. Pain and minimal edema in joint line.   Skin:     General: Skin is warm and dry.      Capillary Refill: Capillary refill takes less than 2 seconds.   Neurological:      Mental Status: He is alert.   Psychiatric:         Mood and Affect: Mood normal.       Administrative Statements   I have spent a total time of 40 minutes in caring for this patient on the day of the visit/encounter including Diagnostic results, Prognosis, Risks and benefits of tx options, Instructions for management, Patient and family education, Risk factor reductions, Impressions, Counseling / Coordination of care, Documenting in the medical record, Reviewing / ordering tests, medicine, procedures  , Obtaining or reviewing history  , and Communicating with other healthcare professionals .  "

## 2024-07-29 NOTE — PATIENT INSTRUCTIONS
"Patient Education     Routine physical for adults   The Basics   Written by the doctors and editors at [facility]   What is a physical? -- A physical is a routine visit, or \"check-up,\" with your doctor. You might also hear it called a \"wellness visit\" or \"preventive visit.\"  During each visit, the doctor will:   Ask about your physical and mental health   Ask about your habits, behaviors, and lifestyle   Do an exam   Give you vaccines if needed   Talk to you about any medicines you take   Give advice about your health   Answer your questions  Getting regular check-ups is an important part of taking care of your health. It can help your doctor find and treat any problems you have. But it's also important for preventing health problems.  A routine physical is different from a \"sick visit.\" A sick visit is when you see a doctor because of a health concern or problem. Since physicals are scheduled ahead of time, you can think about what you want to ask the doctor.  How often should I get a physical? -- It depends on your age and health. In general, for people age 21 years and older:   If you are younger than 50 years, you might be able to get a physical every 3 years.   If you are 50 years or older, your doctor might recommend a physical every year.  If you have an ongoing health condition, like diabetes or high blood pressure, your doctor will probably want to see you more often.  What happens during a physical? -- In general, each visit will include:   Physical exam - The doctor or nurse will check your height, weight, heart rate, and blood pressure. They will also look at your eyes and ears. They will ask about how you are feeling and whether you have any symptoms that bother you.   Medicines - It's a good idea to bring a list of all the medicines you take to each doctor visit. Your doctor will talk to you about your medicines and answer any questions. Tell them if you are having any side effects that bother you. You " "should also tell them if you are having trouble paying for any of your medicines.   Habits and behaviors - This includes:   Your diet   Your exercise habits   Whether you smoke, drink alcohol, or use drugs   Whether you are sexually active   Whether you feel safe at home  Your doctor will talk to you about things you can do to improve your health and lower your risk of health problems. They will also offer help and support. For example, if you want to quit smoking, they can give you advice and might prescribe medicines. If you want to improve your diet or get more physical activity, they can help you with this, too.   Lab tests, if needed - The tests you get will depend on your age and situation. For example, your doctor might want to check your:   Cholesterol   Blood sugar   Iron level   Vaccines - The recommended vaccines will depend on your age, health, and what vaccines you already had. Vaccines are very important because they can prevent certain serious or deadly infections.   Discussion of screening - \"Screening\" means checking for diseases or other health problems before they cause symptoms. Your doctor can recommend screening based on your age, risk, and preferences. This might include tests to check for:   Cancer, such as breast, prostate, cervical, ovarian, colorectal, prostate, lung, or skin cancer   Sexually transmitted infections, such as chlamydia and gonorrhea   Mental health conditions like depression and anxiety  Your doctor will talk to you about the different types of screening tests. They can help you decide which screenings to have. They can also explain what the results might mean.   Answering questions - The physical is a good time to ask the doctor or nurse questions about your health. If needed, they can refer you to other doctors or specialists, too.  Adults older than 65 years often need other care, too. As you get older, your doctor will talk to you about:   How to prevent falling at " home   Hearing or vision tests   Memory testing   How to take your medicines safely   Making sure that you have the help and support you need at home  All topics are updated as new evidence becomes available and our peer review process is complete.  This topic retrieved from [employer] on: May 02, 2024.  Topic 076750 Version 1.0  Release: 32.4.3 - C32.122  © 2024 UpToDate, Inc. and/or its affiliates. All rights reserved.  Consumer Information Use and Disclaimer   Disclaimer: This generalized information is a limited summary of diagnosis, treatment, and/or medication information. It is not meant to be comprehensive and should be used as a tool to help the user understand and/or assess potential diagnostic and treatment options. It does NOT include all information about conditions, treatments, medications, side effects, or risks that may apply to a specific patient. It is not intended to be medical advice or a substitute for the medical advice, diagnosis, or treatment of a health care provider based on the health care provider's examination and assessment of a patient's specific and unique circumstances. Patients must speak with a health care provider for complete information about their health, medical questions, and treatment options, including any risks or benefits regarding use of medications. This information does not endorse any treatments or medications as safe, effective, or approved for treating a specific patient. UpToDate, Inc. and its affiliates disclaim any warranty or liability relating to this information or the use thereof.The use of this information is governed by the Terms of Use, available at https://www.woltersGameyeeeahuwer.com/en/know/clinical-effectiveness-terms. 2024© UpToDate, Inc. and its affiliates and/or licensors. All rights reserved.  Copyright   © 2024 UpToDate, Inc. and/or its affiliates. All rights reserved.

## 2024-10-01 ENCOUNTER — OFFICE VISIT (OUTPATIENT)
Dept: FAMILY MEDICINE CLINIC | Facility: CLINIC | Age: 34
End: 2024-10-01

## 2024-10-01 VITALS
WEIGHT: 220 LBS | SYSTOLIC BLOOD PRESSURE: 123 MMHG | HEIGHT: 70 IN | TEMPERATURE: 98.2 F | OXYGEN SATURATION: 97 % | HEART RATE: 72 BPM | DIASTOLIC BLOOD PRESSURE: 75 MMHG | BODY MASS INDEX: 31.5 KG/M2

## 2024-10-01 DIAGNOSIS — B34.9 VIRAL ILLNESS: Primary | ICD-10-CM

## 2024-10-01 PROCEDURE — 99213 OFFICE O/P EST LOW 20 MIN: CPT | Performed by: FAMILY MEDICINE

## 2024-10-01 NOTE — ASSESSMENT & PLAN NOTE
- Endorses viral like sx since around 9/21. Started with a productive cough.  Symptoms improved for several days then returned with a dry cough, chills and body aches  - Has been around multiple sick contacts   - Sx controlled with Dayquil and Nyquil. He states he is feeling better than when symptoms first started.    On exam today patient afebrile. Lungs clear to auscultation bilaterally. No wheezing nor decreased air movement.     Plan  - Continue symptoms management with Dayquil/Nyquil, fluids, rest  - Return to office if symptoms do not improve in another 7-10 days

## 2024-10-01 NOTE — PROGRESS NOTES
Ambulatory Visit  Name: Duane Fleming      : 1990      MRN: [id number]  Encounter Provider: Kendal Cao MD  Encounter Date: 10/1/2024   [facility]    Assessment & Plan  Viral illness  - Endorses viral like sx since around . Started with a productive cough.  Symptoms improved for several days then returned with a dry cough, chills and body aches  - Has been around multiple sick contacts   - Sx controlled with Dayquil and Nyquil. He states he is feeling better than when symptoms first started.    On exam today patient afebrile. Lungs clear to auscultation bilaterally. No wheezing nor decreased air movement.     Plan  - Continue symptoms management with Dayquil/Nyquil, fluids, rest  - Return to office if symptoms do not improve in another 7-10 days             History of Present Illness     Patient states around  he started having a productive cough with mucus. He says it became more of a dry cough with some slight chest pain from the increase in coughing. He thought it got better for a few days but states in the past couple days he has had body aches and chills off and on. He denies fevers. He has been doing symptom management with Dayquil and Nyquil. He states his symptoms feel better with these medications. He states his girlfriend has bronchitis and that his girlfriend's daughter had walking pneumonia last week and was treated for it.         History obtained from : patient  Review of Systems   Constitutional:  Positive for chills. Negative for activity change, appetite change and fever.   HENT:  Positive for congestion. Negative for sinus pain and sore throat.    Eyes:  Negative for pain and visual disturbance.   Respiratory:  Positive for cough. Negative for shortness of breath.    Cardiovascular:  Negative for chest pain and palpitations.   Gastrointestinal:  Negative for abdominal pain, nausea and vomiting.   Genitourinary:   "Negative for difficulty urinating.   Skin:  Negative for rash.   Neurological:  Negative for dizziness and headaches.           Objective     /75 (BP Location: Left arm, Patient Position: Sitting, Cuff Size: Large)   Pulse 72   Temp 98.2 °F (36.8 °C) (Temporal)   Ht 5' 10\" (1.778 m)   Wt 99.8 kg (220 lb)   SpO2 97%   BMI 31.57 kg/m²     Physical Exam  Constitutional:       Appearance: Normal appearance.   HENT:      Head: Normocephalic and atraumatic.      Right Ear: External ear normal.      Left Ear: External ear normal.      Nose: Nose normal.      Mouth/Throat:      Mouth: Mucous membranes are moist.      Pharynx: Oropharynx is clear.   Eyes:      Conjunctiva/sclera: Conjunctivae normal.   Cardiovascular:      Rate and Rhythm: Normal rate and regular rhythm.   Pulmonary:      Effort: Pulmonary effort is normal. No respiratory distress.      Breath sounds: Normal breath sounds. No wheezing or rhonchi.   Abdominal:      General: Bowel sounds are normal.      Palpations: Abdomen is soft.   Skin:     General: Skin is warm and dry.   Neurological:      General: No focal deficit present.      Mental Status: He is alert and oriented to person, place, and time.   Psychiatric:         Mood and Affect: Mood normal.         Behavior: Behavior normal.           Kendal Cao MD   PGY-2, Family Medicine  [facility]     "